# Patient Record
Sex: MALE | URBAN - METROPOLITAN AREA
[De-identification: names, ages, dates, MRNs, and addresses within clinical notes are randomized per-mention and may not be internally consistent; named-entity substitution may affect disease eponyms.]

---

## 2022-06-03 ENCOUNTER — PREPPED CHART (OUTPATIENT)
Dept: URBAN - METROPOLITAN AREA CLINIC 52 | Facility: CLINIC | Age: 62
End: 2022-06-03

## 2022-07-18 ENCOUNTER — NEW PATIENT (OUTPATIENT)
Dept: URBAN - METROPOLITAN AREA CLINIC 52 | Facility: CLINIC | Age: 62
End: 2022-07-18

## 2022-07-18 DIAGNOSIS — G24.5: ICD-10-CM

## 2022-07-18 DIAGNOSIS — H35.371: ICD-10-CM

## 2022-07-18 DIAGNOSIS — H25.13: ICD-10-CM

## 2022-07-18 PROCEDURE — 92004 COMPRE OPH EXAM NEW PT 1/>: CPT

## 2022-07-18 PROCEDURE — 92015 DETERMINE REFRACTIVE STATE: CPT

## 2022-07-18 PROCEDURE — 92134 CPTRZ OPH DX IMG PST SGM RTA: CPT

## 2022-07-18 ASSESSMENT — VISUAL ACUITY
OD_CC: 20/30-1
OS_GLARE: 20/25
OU_CC: 20/20-1
OD_SC: 20/40
OU_CC: J1+
OS_SC: 20/50
OS_GLARE: 20/20
OS_CC: 20/20-1
OD_GLARE: 20/40
OD_GLARE: 20/60

## 2022-07-18 ASSESSMENT — TONOMETRY
OD_IOP_MMHG: 17
OS_IOP_MMHG: 18

## 2022-07-18 NOTE — PATIENT DISCUSSION
Patient has been using botox for 6 years. Slight improvement without Botox in the past months. Will hold botox and suggest exploring acupuncture. Explained how spasm is related to brain sensors that provoke Parkinson's.

## 2023-07-24 ENCOUNTER — COMPREHENSIVE EXAM (OUTPATIENT)
Dept: URBAN - METROPOLITAN AREA CLINIC 52 | Facility: CLINIC | Age: 63
End: 2023-07-24

## 2023-07-24 DIAGNOSIS — H35.371: ICD-10-CM

## 2023-07-24 DIAGNOSIS — H25.13: ICD-10-CM

## 2023-07-24 DIAGNOSIS — G24.5: ICD-10-CM

## 2023-07-24 PROCEDURE — 92015 DETERMINE REFRACTIVE STATE: CPT

## 2023-07-24 PROCEDURE — 92014 COMPRE OPH EXAM EST PT 1/>: CPT

## 2023-07-24 PROCEDURE — 92134 CPTRZ OPH DX IMG PST SGM RTA: CPT

## 2023-07-24 ASSESSMENT — TONOMETRY
OD_IOP_MMHG: 16
OS_IOP_MMHG: 17

## 2023-07-24 ASSESSMENT — VISUAL ACUITY
OS_GLARE: 20/20
OD_GLARE: 20/30
OD_GLARE: 20/30
OU_CC: J1+
OD_CC: 20/30
OS_GLARE: 20/20
OU_CC: 20/20
OS_CC: 20/20